# Patient Record
Sex: MALE | Race: BLACK OR AFRICAN AMERICAN | Employment: UNEMPLOYED | URBAN - METROPOLITAN AREA
[De-identification: names, ages, dates, MRNs, and addresses within clinical notes are randomized per-mention and may not be internally consistent; named-entity substitution may affect disease eponyms.]

---

## 2018-09-17 ENCOUNTER — NURSE NAVIGATOR (OUTPATIENT)
Dept: OTHER | Age: 58
End: 2018-09-17

## 2018-09-17 NOTE — NURSE NAVIGATOR
Referring Provider: Jen Lemus MD      Lung Cancer Risk Profile:   Age: 61  Gender: Male  Height: 71\"  Weight: 214#    Smoking History:  Smoking Status: current use  # years smokin  # years quit: 0  Packs/day: 0.75  Pack years: 30    Patient discussed smoking cessation with PCP: Yes, per patient report    Patient participated in shared decision making process with PCP: Unknown    Patient is currently experiencing symptoms: No, per patient report    If yes what symptoms:     Co-Morbidities:  COPD, CAD     Cancer History:      Additional Risk Factors: Exposure to second hand smoke      Patient's smoking history discussed via phone. Patient meets LDCT lung cancer screening criteria.  Call transferred to central scheduling to schedule exam.      YOSELIN ShermanN, RN, [address] [address] [facility] Nurse Navigator

## 2018-10-19 ENCOUNTER — HOSPITAL ENCOUNTER (OUTPATIENT)
Dept: CT IMAGING | Age: 58
Discharge: HOME | End: 2018-10-19
Attending: INTERNAL MEDICINE
Payer: MEDICAID

## 2018-10-19 DIAGNOSIS — R06.02 SHORTNESS OF BREATH: ICD-10-CM

## 2018-10-19 DIAGNOSIS — F17.200 SMOKING: ICD-10-CM

## 2018-10-19 PROCEDURE — 71250 CT THORAX DX C-: CPT

## 2019-06-11 PROBLEM — J81.0 ACUTE PULMONARY EDEMA (HCC): Status: ACTIVE | Noted: 2019-06-11

## 2019-06-11 PROBLEM — L02.215 PERINEAL ABSCESS: Status: ACTIVE | Noted: 2019-02-07

## 2020-12-20 ENCOUNTER — APPOINTMENT (OUTPATIENT)
Dept: CT IMAGING | Age: 60
End: 2020-12-20
Attending: EMERGENCY MEDICINE
Payer: MEDICAID

## 2020-12-20 ENCOUNTER — HOSPITAL ENCOUNTER (EMERGENCY)
Age: 60
Discharge: HOME | End: 2020-12-20
Attending: EMERGENCY MEDICINE
Payer: MEDICAID

## 2020-12-20 VITALS
TEMPERATURE: 97.8 F | HEIGHT: 73 IN | OXYGEN SATURATION: 100 % | DIASTOLIC BLOOD PRESSURE: 55 MMHG | HEART RATE: 101 BPM | RESPIRATION RATE: 20 BRPM | BODY MASS INDEX: 29.82 KG/M2 | SYSTOLIC BLOOD PRESSURE: 94 MMHG | WEIGHT: 225 LBS

## 2020-12-20 DIAGNOSIS — R56.9 NEW ONSET SEIZURE WITHOUT HEAD TRAUMA (HCC): Primary | ICD-10-CM

## 2020-12-20 LAB
AMPHET UR QL SCN: NEGATIVE
ANION GAP SERPL CALC-SCNC: 24 MMOL/L (ref 3–18)
BARBITURATES UR QL SCN: NEGATIVE
BASOPHILS # BLD: 0 K/UL (ref 0–0.1)
BASOPHILS NFR BLD: 0 % (ref 0–3)
BENZODIAZ UR QL: NEGATIVE
BUN SERPL-MCNC: 47 MG/DL (ref 7–18)
BUN/CREAT SERPL: 9 (ref 12–20)
CALCIUM SERPL-MCNC: 8.2 MG/DL (ref 8.5–10.1)
CANNABINOIDS UR QL SCN: NEGATIVE
CHLORIDE SERPL-SCNC: 97 MMOL/L (ref 100–111)
CO2 SERPL-SCNC: 12 MMOL/L (ref 21–32)
COCAINE UR QL SCN: NEGATIVE
CREAT SERPL-MCNC: 5.5 MG/DL (ref 0.6–1.3)
DIFFERENTIAL METHOD BLD: ABNORMAL
EOSINOPHIL # BLD: 0 K/UL (ref 0–0.4)
EOSINOPHIL NFR BLD: 0 % (ref 0–5)
ERYTHROCYTE [DISTWIDTH] IN BLOOD BY AUTOMATED COUNT: 21.6 % (ref 11.6–14.5)
ETHANOL SERPL-MCNC: <3 MG/DL (ref 0–3)
GLUCOSE SERPL-MCNC: 165 MG/DL (ref 74–99)
HCT VFR BLD AUTO: 32.2 % (ref 36–48)
HDSCOM,HDSCOM: NORMAL
HGB BLD-MCNC: 11 G/DL (ref 13–16)
LYMPHOCYTES # BLD: 0.7 K/UL (ref 0.8–3.5)
LYMPHOCYTES NFR BLD: 5 % (ref 20–51)
MAGNESIUM SERPL-MCNC: 2 MG/DL (ref 1.6–2.6)
MCH RBC QN AUTO: 28.3 PG (ref 24–34)
MCHC RBC AUTO-ENTMCNC: 34.2 G/DL (ref 31–37)
MCV RBC AUTO: 82.8 FL (ref 74–97)
METHADONE UR QL: NEGATIVE
MONOCYTES # BLD: 0.4 K/UL (ref 0–1)
MONOCYTES NFR BLD: 3 % (ref 2–9)
NEUTS BAND NFR BLD MANUAL: 7 % (ref 0–5)
NEUTS SEG # BLD: 12 K/UL (ref 1.8–8)
NEUTS SEG NFR BLD: 85 % (ref 42–75)
OPIATES UR QL: NEGATIVE
PCP UR QL: NEGATIVE
PHOSPHATE SERPL-MCNC: 4 MG/DL (ref 2.5–4.9)
PLATELET # BLD AUTO: ABNORMAL K/UL (ref 135–420)
PLATELET COMMENTS,PCOM: ABNORMAL
POTASSIUM SERPL-SCNC: 3.1 MMOL/L (ref 3.5–5.5)
RBC # BLD AUTO: 3.89 M/UL (ref 4.7–5.5)
RBC MORPH BLD: ABNORMAL
SODIUM SERPL-SCNC: 133 MMOL/L (ref 136–145)
WBC # BLD AUTO: 14.1 K/UL (ref 4.6–13.2)

## 2020-12-20 PROCEDURE — 84100 ASSAY OF PHOSPHORUS: CPT

## 2020-12-20 PROCEDURE — 80307 DRUG TEST PRSMV CHEM ANLYZR: CPT

## 2020-12-20 PROCEDURE — 70450 CT HEAD/BRAIN W/O DYE: CPT

## 2020-12-20 PROCEDURE — 74011250636 HC RX REV CODE- 250/636: Performed by: EMERGENCY MEDICINE

## 2020-12-20 PROCEDURE — 99285 EMERGENCY DEPT VISIT HI MDM: CPT

## 2020-12-20 PROCEDURE — 85025 COMPLETE CBC W/AUTO DIFF WBC: CPT

## 2020-12-20 PROCEDURE — 74011000250 HC RX REV CODE- 250: Performed by: EMERGENCY MEDICINE

## 2020-12-20 PROCEDURE — 83735 ASSAY OF MAGNESIUM: CPT

## 2020-12-20 PROCEDURE — 80048 BASIC METABOLIC PNL TOTAL CA: CPT

## 2020-12-20 RX ADMIN — FOLIC ACID: 5 INJECTION, SOLUTION INTRAMUSCULAR; INTRAVENOUS; SUBCUTANEOUS at 17:39

## 2020-12-20 NOTE — ED PROVIDER NOTES
[address] [facility] EMERGENCY DEPT      5:09 PM    Date: 12/20/2020  Patient Name: Letha Blakely    History of Presenting Illness     Chief Complaint   Patient presents with    Seizure       63 y.o. male with noted past medical history who presents to the emergency department status post seizure. The patient has no history of prior epilepsy. Per the wife who was at home with the patient, the patient was in bed watching TV and she heard a thump and subsequently found him on the floor by the bed having a generalized tonic-clonic seizure last approximately 5 minutes before spontaneous resolution. Fire rescue arrived the patient was in a postictal phase. During transport to the emergency department the patient became more awake alert. He had no bowel or bladder continence no tongue biting. He currently has no complaints on initial MD exam.  Denies any head trauma or head injury or headache. Patient is a known drinker and states he drinks at least a bottle of wine a day but also states that sometimes he does not drink at all. Patient denies any other associated signs or symptoms. Patient denies any other complaints. Nursing notes regarding the HPI and triage nursing notes were reviewed. Prior medical records were reviewed. Current Outpatient Medications   Medication Sig Dispense Refill    metoprolol tartrate (LOPRESSOR) 50 mg tablet TAKE 1 TABLET BY MOUTH TWICE DAILY  2    NIFEdipine ER (PROCARDIA XL) 30 mg ER tablet       FLOVENT HFA 44 mcg/actuation inhaler       ALBUTEROL IN Take  by inhalation.  aspirin 81 mg chewable tablet Take 81 mg by mouth.  tiotropium (SPIRIVA WITH HANDIHALER) 18 mcg inhalation capsule Take 1 Cap by inhalation.  VENTOLIN HFA 90 mcg/actuation inhaler       hydrochlorothiazide (HYDRODIURIL) 25 mg tablet Take 1 tablet by mouth daily.  isosorbide dinitrate (ISORDIL) 10 mg tablet Take 1 tablet by mouth daily.       NITROSTAT 0.4 mg SL tablet Take 1 tablet by mouth as needed. Past History     Past Medical History:  Past Medical History:   Diagnosis Date    [facility] obstructive [facility]     Heart attack ([facility] 2005    Heart disease     Hypertension     Perineal abscess     Pulmonary edema     Renal failure     Sleep apnea     Urethral stone        Past Surgical History:  Past Surgical History:   Procedure Laterality Date    HX UROLOGICAL  02/2007    CYSTOSCOPY. [facility] [place]  Dr Hedy Winters HX UROLOGICAL  02/07/2019    1) Incision and drainage of abscess with wide local tissue debridement, cystoscopy, intra-operative olea catheter placement- Dr. Konstantin Arteaga HX UROLOGICAL  02/19/2019    cystoscopy, catheter change, fistula biopsy and dressing change- Dr. Keshia Davidson       Family History:  Family History   Problem Relation Age of Onset    Heart Disease Mother     Heart Disease Father     No Known Problems Sister     No Known Problems Sister     No Known Problems Sister        Social History:  Social History     Tobacco Use    Smoking status: Current Every Day Smoker     Years: 20.00     Types: Cigarettes    Smokeless tobacco: Never Used   Substance Use Topics    Alcohol use: Yes     Frequency: 2-4 times a month    Drug use: Never       Allergies:  No Known Allergies    Patient's primary care provider (as noted in EPIC):  None    Review of Systems   Constitutional: Negative for diaphoresis. HENT: Negative for congestion. Eyes: Negative for discharge. Respiratory: Negative for stridor. Cardiovascular: Negative for palpitations. Gastrointestinal: Negative for diarrhea. Endocrine: Negative for heat intolerance. Genitourinary: Negative for flank pain. Musculoskeletal: Negative for back pain. Neurological: Negative for weakness. Psychiatric/Behavioral: Negative for hallucinations. All other systems reviewed and are negative.       Visit Vitals  BP (!) 96/59   Pulse (!) 104   Temp 97.8 °F (36.6 °C)   Resp 20   Ht 6' 1\" (1.854 m)   Wt 102.1 kg (225 lb)   SpO2 100%   BMI 29.69 kg/m²       PHYSICAL EXAM:    CONSTITUTIONAL:  Alert, in no apparent distress;  well developed;  well nourished. HEAD:  Normocephalic, atraumatic. EYES:  EOMI. Non-icteric sclera. Normal conjunctiva. ENTM:  Nose:  no rhinorrhea. Throat:  no erythema or exudate, mucous membranes moist.  NECK:  No JVD. Supple  RESPIRATORY:  Chest clear, equal breath sounds, good air movement. CARDIOVASCULAR:  Regular rate and rhythm. No murmurs, rubs, or gallops. GI:  Normal bowel sounds, abdomen soft and non-tender. No rebound or guarding. BACK:  Non-tender. UPPER EXT:  Normal inspection. LOWER EXT:  No edema, no calf tenderness. Distal pulses intact. NEURO:  Moves all four extremities. Normal motor exam and sensation in all four extremities. Normal CN II-XII exam.  Normal bilateral finger-to-nose exam.     SKIN:  No rashes;  Normal for age. PSYCH:  Alert and normal affect. DIFFERENTIAL DIAGNOSES/ MEDICAL DECISION MAKING: New onset seizures: idiopathic, electrolyte abnormality/ies, head trauma, intracranial hemorrhage, alcohol use or withdrawal, illicit drug use or withdrawal, infection/ meningitis, and/or decreased sleep lowering threshold, sepsis, other etiologies versus combination of the above.       ED COURSE:      Diagnostic Study Results     Abnormal lab results from this emergency department encounter:  Labs Reviewed   CBC WITH AUTOMATED DIFF - Abnormal; Notable for the following components:       Result Value    WBC 14.1 (*)     RBC 3.89 (*)     HGB 11.0 (*)     HCT 32.2 (*)     RDW 21.6 (*)     All other components within normal limits   METABOLIC PANEL, BASIC - Abnormal; Notable for the following components:    Sodium 133 (*)     Potassium 3.1 (*)     Chloride 97 (*)     CO2 12 (*)     Anion gap 24 (*)     Glucose 165 (*)     BUN 47 (*)     Creatinine 5.50 (*)     BUN/Creatinine ratio 9 (*)     GFR est AA 13 (*) GFR est non-AA 11 (*)     Calcium 8.2 (*)     All other components within normal limits   MAGNESIUM   PHOSPHORUS   DRUG SCREEN, URINE   ETHYL ALCOHOL       Lab values for this patient within approximately the last 12 hours:  Recent Results (from the past 12 hour(s))   CBC WITH AUTOMATED DIFF    Collection Time: 12/20/20  5:20 PM   Result Value Ref Range    WBC 14.1 (H) 4.6 - 13.2 K/uL    RBC 3.89 (L) 4.70 - 5.50 M/uL    HGB 11.0 (L) 13.0 - 16.0 g/dL    HCT 32.2 (L) 36.0 - 48.0 %    MCV 82.8 74.0 - 97.0 FL    MCH 28.3 24.0 - 34.0 PG    MCHC 34.2 31.0 - 37.0 g/dL    RDW 21.6 (H) 11.6 - 14.5 %    PLATELET PENDING K/uL    NEUTROPHILS PENDING %    LYMPHOCYTES PENDING %    MONOCYTES PENDING %    EOSINOPHILS PENDING %    BASOPHILS PENDING %    ABS. NEUTROPHILS PENDING K/UL    ABS. LYMPHOCYTES PENDING K/UL    ABS. MONOCYTES PENDING K/UL    ABS. EOSINOPHILS PENDING K/UL    ABS.  BASOPHILS PENDING K/UL    DF PENDING    METABOLIC PANEL, BASIC    Collection Time: 12/20/20  5:20 PM   Result Value Ref Range    Sodium 133 (L) 136 - 145 mmol/L    Potassium 3.1 (L) 3.5 - 5.5 mmol/L    Chloride 97 (L) 100 - 111 mmol/L    CO2 12 (L) 21 - 32 mmol/L    Anion gap 24 (H) 3.0 - 18 mmol/L    Glucose 165 (H) 74 - 99 mg/dL    BUN 47 (H) 7.0 - 18 MG/DL    Creatinine 5.50 (H) 0.6 - 1.3 MG/DL    BUN/Creatinine ratio 9 (L) 12 - 20      GFR est AA 13 (L) >60 ml/min/1.73m2    GFR est non-AA 11 (L) >60 ml/min/1.73m2    Calcium 8.2 (L) 8.5 - 10.1 MG/DL   MAGNESIUM    Collection Time: 12/20/20  5:20 PM   Result Value Ref Range    Magnesium 2.0 1.6 - 2.6 mg/dL   PHOSPHORUS    Collection Time: 12/20/20  5:20 PM   Result Value Ref Range    Phosphorus 4.0 2.5 - 4.9 MG/DL   DRUG SCREEN, URINE    Collection Time: 12/20/20  5:20 PM   Result Value Ref Range    BENZODIAZEPINES Negative NEG      BARBITURATES Negative NEG      THC (TH-CANNABINOL) Negative NEG      OPIATES Negative NEG      PCP(PHENCYCLIDINE) Negative NEG      COCAINE Negative NEG AMPHETAMINES Negative NEG      METHADONE Negative NEG      HDSCOM (NOTE)    ETHYL ALCOHOL    Collection Time: 12/20/20  5:20 PM   Result Value Ref Range    ALCOHOL(ETHYL),SERUM <3 0 - 3 MG/DL       Radiologist and cardiologist interpretations if available at time of this note:  No results found. Medication(s) ordered for patient during this emergency visit encounter:  Medications   0.9% sodium chloride 1,000 mL with mvi, adult no. 4 with vit K 10 mL, thiamine 895 mg, folic acid 1 mg infusion ( IntraVENous New Bag 12/20/20 [id number])       Medical Decision Making     I am the first provider for this patient. I reviewed the vital signs, available nursing notes, past medical history, past surgical history, family history and social history. Vital Signs:  Reviewed the patient's vital signs. CT head per radiologist's report:  CT HEAD WO CONT    (Results Pending)   Preliminary read by the stat read radiology group:  Impression:  No acute intracranial process. Radiologist: Gabe Cabrera MD    Critical Care Note:  Altered Mental Status    Critical care minutes: 35 MINUTES. Given patients presentation to ed with noted change in mental status, numerous serial neurological examinations were performed, as well as evaluation of vital signs. Given the patients underlying condition medical intervention(s) were needed, requiring numerous reevaluations of patient's vital signs and response to different emergency department therapies, total bedside time evaluating and/or treating the patient, not including procedures, is noted below. IMPRESSION AND MEDICAL DECISION MAKING:  Based upon the patients presentation with noted HPI and PE, along with the work up done in the emergency department, I believe that the patient is having new onset seizure(s).   Work up and evaluation in the emergency department show no electrolyte imbalance, infection, or acute neurological process such as stroke or ICH as the etiology of the seizure(s). I believe that the patient can be discharged home with outpatient follow up with a neurologist.     DIAGNOSIS:  1. New onset seizure    SPECIFIC PATIENT INSTRUCTIONS FROM THE PHYSICIAN WHO TREATED YOU IN THE ER TODAY:  1. Return if any concerns or worsening of condition(s)  2. Follow up with your neurologist or the one listed in these discharge instructions in 1-2 days. Patient is improved, resting quietly and comfortably. The patient will be discharged home. The patient was reassured that these symptoms do not appear to represent a serious or life threatening condition at this time. Warning signs of worsening condition were discussed and understood by the patient. Based on patient's age, coexisting illness, exam, and the results of this ED evaluation, the decision to treat as an outpatient was made. Based on the information available at time of discharge, acute pathology requiring immediate intervention was deemed relative unlikely. While it is impossible to completely exclude the possibility of underlying serious disease or worsening of condition, I feel the relative likelihood is extremely low. I discussed this uncertainty with the patient, who understood ED evaluation and treatment and felt comfortable with the outpatient treatment plan. All questions regarding care, test results, and follow up were answered. The patient is stable and appropriate to discharge. They understand that they should return to the emergency department for any new or worsening symptoms. I stressed the importance of follow up for repeat assessment and possibly further evaluation/treatment. Dictation disclaimer:  Please note that this dictation was completed with [employer], the [employer] voice recognition software. Quite often unanticipated grammatical, syntax, homophones, and other interpretive errors are inadvertently transcribed by the computer software.   Please disregard these errors. Please excuse any errors that have escaped final proofreading. Coding Diagnoses     Clinical Impression:   1. New onset seizure without head trauma [facility])        Disposition     Disposition: Discharge. Javier Clifford M.D. MARCIO Board Certified Emergency Physician    Provider Attestation:  If a scribe was utilized in generation of this patient record, I personally performed the services described in the documentation, reviewed the documentation, as recorded by the scribe in my presence, and it accurately records the patient's history of presenting illness, review of systems, patient physical examination, and procedures performed by me as the attending physician. Javier Clifford M.D.   MARCIO Board Certified Emergency Physician  12/20/2020.  5:10 PM

## 2020-12-20 NOTE — ED TRIAGE NOTES
Pt arrives to ed via ems for c/o witnessed seizure. No seizure hx. No loss of bowels, pt did not bite tongue. Pt alert to person, place, and situation on arrival.  Pt states drinks a bottle of wine everyday.

## 2020-12-20 NOTE — DISCHARGE INSTRUCTIONS
SPECIFIC PATIENT INSTRUCTIONS FROM THE PHYSICIAN WHO TREATED YOU IN THE ER TODAY:  1. Return if any concerns or worsening of condition(s)  2. Follow up with your neurologist or the one listed in these discharge instructions in 1-2 days. MyChart Activation    Thank you for requesting access to [employer]. Please follow the instructions below to securely access and download your online medical record. [employer] allows you to send messages to your doctor, view your test results, renew your prescriptions, schedule appointments, and more. How Do I Sign Up? In your internet browser, go to [email or web address]. Click on the First Time User? Click Here link in the Sign In box. You will see the New Member Sign Up page. Enter your [employer] Access Code exactly as it appears below. You will not need to use this code after you´ve completed the sign-up process. If you do not sign up before the expiration date, you must request a new code. [employer] Access Code: [email or web address]  Expires: 3/28/2019  2:27 PM (This is the date your [employer] access code will )    Enter the last four digits of your Social Security Number (xxxx) and Date of Birth (mm/dd/yyyy) as indicated and click Submit. You will be taken to the next sign-up page. Create a [employer] ID. This will be your [employer] login ID and cannot be changed, so think of one that is secure and easy to remember. Create a [employer] password. You can change your password at any time. Enter your Password Reset Question and Answer. This can be used at a later time if you forget your password. Enter your e-mail address. You will receive e-mail notification when new information is available in [address] Click Sign Up. You can now view and download portions of your medical record. Click the [employer]andem link to download a portable copy of your medical information.     Additional Information    If you have questions, please visit the Frequently Asked Questions section of the [employer] website at [email or web address] Remember, [employer] is NOT to be used for urgent needs. For medical emergencies, dial 911.

## 2020-12-21 NOTE — ED NOTES
8:06 PM  12/20/20     Discharge instructions given to patient (name) with verbalization of understanding. Patient accompanied by self. Patient discharged with the following prescriptions none. Patient discharged to home (destination).       Flory Marie RN